# Patient Record
Sex: MALE | Race: WHITE
[De-identification: names, ages, dates, MRNs, and addresses within clinical notes are randomized per-mention and may not be internally consistent; named-entity substitution may affect disease eponyms.]

---

## 2021-02-24 ENCOUNTER — HOSPITAL ENCOUNTER (INPATIENT)
Dept: HOSPITAL 12 - ER | Age: 56
LOS: 2 days | Discharge: NURSING, REHAB OR LONG-TERM CARE | DRG: 917 | End: 2021-02-26
Payer: COMMERCIAL

## 2021-02-24 VITALS — BODY MASS INDEX: 24.68 KG/M2 | HEIGHT: 70 IN | WEIGHT: 172.38 LBS

## 2021-02-24 VITALS — SYSTOLIC BLOOD PRESSURE: 124 MMHG | DIASTOLIC BLOOD PRESSURE: 78 MMHG

## 2021-02-24 DIAGNOSIS — Z95.5: ICD-10-CM

## 2021-02-24 DIAGNOSIS — E86.0: ICD-10-CM

## 2021-02-24 DIAGNOSIS — R53.1: ICD-10-CM

## 2021-02-24 DIAGNOSIS — I25.10: ICD-10-CM

## 2021-02-24 DIAGNOSIS — F41.9: ICD-10-CM

## 2021-02-24 DIAGNOSIS — R62.7: ICD-10-CM

## 2021-02-24 DIAGNOSIS — D72.829: ICD-10-CM
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
## 2021-02-24 DIAGNOSIS — Z86.73: ICD-10-CM

## 2021-02-24 DIAGNOSIS — F13.239: ICD-10-CM

## 2021-02-24 DIAGNOSIS — T42.4X1A: Primary | ICD-10-CM

## 2021-02-24 DIAGNOSIS — Z79.02: ICD-10-CM

## 2021-02-24 DIAGNOSIS — Z79.82: ICD-10-CM

## 2021-02-24 DIAGNOSIS — G92: ICD-10-CM

## 2021-02-24 DIAGNOSIS — F29: ICD-10-CM

## 2021-02-24 DIAGNOSIS — F14.10: ICD-10-CM

## 2021-02-24 DIAGNOSIS — F33.2: ICD-10-CM

## 2021-02-24 DIAGNOSIS — Z20.822: ICD-10-CM

## 2021-02-24 LAB
ALP SERPL-CCNC: 83 U/L (ref 50–136)
ALT SERPL W/O P-5'-P-CCNC: 22 U/L (ref 16–63)
APAP SERPL-MCNC: < 2 UG/ML (ref 10–30)
AST SERPL-CCNC: 14 U/L (ref 15–37)
BASOPHILS # BLD AUTO: 0 K/UL (ref 0–8)
BASOPHILS NFR BLD AUTO: 0.3 % (ref 0–2)
BILIRUB DIRECT SERPL-MCNC: 0.1 MG/DL (ref 0–0.2)
BILIRUB SERPL-MCNC: 0.5 MG/DL (ref 0.2–1)
BUN SERPL-MCNC: 16 MG/DL (ref 7–18)
CHLORIDE SERPL-SCNC: 101 MMOL/L (ref 98–107)
CO2 SERPL-SCNC: 24 MMOL/L (ref 21–32)
CREAT SERPL-MCNC: 0.9 MG/DL (ref 0.6–1.3)
EOSINOPHIL # BLD AUTO: 0 K/UL (ref 0–0.7)
EOSINOPHIL NFR BLD AUTO: 0.3 % (ref 0–7)
ETHANOL SERPL-MCNC: < 3 MG/DL (ref 0–0)
GLUCOSE SERPL-MCNC: 105 MG/DL (ref 74–106)
HCT VFR BLD AUTO: 55.9 % (ref 36.7–47.1)
HGB BLD-MCNC: 19.1 G/DL (ref 12.5–16.3)
LYMPHOCYTES # BLD AUTO: 2.1 K/UL (ref 20–40)
LYMPHOCYTES NFR BLD AUTO: 17.1 % (ref 20.5–51.5)
MCH RBC QN AUTO: 30.4 UUG (ref 23.8–33.4)
MCHC RBC AUTO-ENTMCNC: 34 G/DL (ref 32.5–36.3)
MCV RBC AUTO: 89 FL (ref 73–96.2)
MONOCYTES # BLD AUTO: 1.3 K/UL (ref 2–10)
MONOCYTES NFR BLD AUTO: 10.5 % (ref 0–11)
NEUTROPHILS # BLD AUTO: 8.9 K/UL (ref 1.8–8.9)
NEUTROPHILS NFR BLD AUTO: 71.8 % (ref 38.5–71.5)
PLATELET # BLD AUTO: 348 K/UL (ref 152–348)
POTASSIUM SERPL-SCNC: 4 MMOL/L (ref 3.5–5.1)
RBC # BLD AUTO: 6.28 MIL/UL (ref 4.06–5.63)
TSH SERPL DL<=0.005 MIU/L-ACNC: 2.42 MIU/ML (ref 0.36–3.74)
WBC # BLD AUTO: 12.4 K/UL (ref 3.6–10.2)
WS STN SPEC: 8 G/DL (ref 6.4–8.2)

## 2021-02-24 PROCEDURE — G0378 HOSPITAL OBSERVATION PER HR: HCPCS

## 2021-02-24 PROCEDURE — G0480 DRUG TEST DEF 1-7 CLASSES: HCPCS

## 2021-02-24 RX ADMIN — ASPIRIN SCH MG: 81 TABLET, CHEWABLE ORAL at 21:42

## 2021-02-24 RX ADMIN — Medication SCH MG: at 21:42

## 2021-02-24 RX ADMIN — SODIUM CHLORIDE PRN MLS/HR: 0.9 INJECTION, SOLUTION INTRAVENOUS at 21:43

## 2021-02-24 RX ADMIN — METOPROLOL SUCCINATE SCH MG: 23.75 TABLET, FILM COATED, EXTENDED RELEASE ORAL at 22:55

## 2021-02-24 RX ADMIN — CLOPIDOGREL BISULFATE SCH MG: 75 TABLET ORAL at 21:42

## 2021-02-24 RX ADMIN — ALPRAZOLAM SCH MG: 0.25 TABLET ORAL at 22:56

## 2021-02-24 RX ADMIN — ATORVASTATIN CALCIUM SCH MG: 20 TABLET, FILM COATED ORAL at 21:42

## 2021-02-24 NOTE — NUR
Patient arrived via RA 99 for palpitations, patient able to transfer from 
[facility] to [facility] with assistance, MD at bedside for assessment

## 2021-02-24 NOTE — NUR
Received patient via gurney from ED. Admitted for FTT. No s/s of acute distress noted at 
this time. Patient is AAOX2-3, confused at times. Pt on RA denies SOB. Cardiac monitor in 
place, NSR. Provided patient with reorientation to the unit. Safety measures and precautions 
in place. Bed alarm on, locked in place, and call light within reach.

## 2021-02-25 VITALS — SYSTOLIC BLOOD PRESSURE: 120 MMHG | DIASTOLIC BLOOD PRESSURE: 63 MMHG

## 2021-02-25 VITALS — DIASTOLIC BLOOD PRESSURE: 72 MMHG | SYSTOLIC BLOOD PRESSURE: 112 MMHG

## 2021-02-25 VITALS — DIASTOLIC BLOOD PRESSURE: 61 MMHG | SYSTOLIC BLOOD PRESSURE: 127 MMHG

## 2021-02-25 VITALS — SYSTOLIC BLOOD PRESSURE: 124 MMHG | DIASTOLIC BLOOD PRESSURE: 78 MMHG

## 2021-02-25 VITALS — DIASTOLIC BLOOD PRESSURE: 62 MMHG | SYSTOLIC BLOOD PRESSURE: 114 MMHG

## 2021-02-25 LAB
BASOPHILS # BLD AUTO: 0 K/UL (ref 0–8)
BASOPHILS NFR BLD AUTO: 0.6 % (ref 0–2)
BUN SERPL-MCNC: 15 MG/DL (ref 7–18)
CHLORIDE SERPL-SCNC: 107 MMOL/L (ref 98–107)
CHOLEST SERPL-MCNC: 152 MG/DL (ref ?–200)
CO2 SERPL-SCNC: 20 MMOL/L (ref 21–32)
CREAT SERPL-MCNC: 0.8 MG/DL (ref 0.6–1.3)
EOSINOPHIL # BLD AUTO: 0.1 K/UL (ref 0–0.7)
EOSINOPHIL NFR BLD AUTO: 0.8 % (ref 0–7)
GLUCOSE SERPL-MCNC: 106 MG/DL (ref 74–106)
HCT VFR BLD AUTO: 49.6 % (ref 36.7–47.1)
HDLC SERPL-MCNC: 31 MG/DL (ref 40–60)
HGB BLD-MCNC: 16.8 G/DL (ref 12.5–16.3)
LYMPHOCYTES # BLD AUTO: 1.9 K/UL (ref 20–40)
LYMPHOCYTES NFR BLD AUTO: 22.7 % (ref 20.5–51.5)
MAGNESIUM SERPL-MCNC: 2.3 MG/DL (ref 1.8–2.4)
MCH RBC QN AUTO: 30.7 UUG (ref 23.8–33.4)
MCHC RBC AUTO-ENTMCNC: 34 G/DL (ref 32.5–36.3)
MCV RBC AUTO: 90.6 FL (ref 73–96.2)
MONOCYTES # BLD AUTO: 0.9 K/UL (ref 2–10)
MONOCYTES NFR BLD AUTO: 10.4 % (ref 0–11)
NEUTROPHILS # BLD AUTO: 5.4 K/UL (ref 1.8–8.9)
NEUTROPHILS NFR BLD AUTO: 65.5 % (ref 38.5–71.5)
PHOSPHATE SERPL-MCNC: 2.3 MG/DL (ref 2.5–4.9)
PLATELET # BLD AUTO: 271 K/UL (ref 152–348)
POTASSIUM SERPL-SCNC: 3.5 MMOL/L (ref 3.5–5.1)
RBC # BLD AUTO: 5.47 MIL/UL (ref 4.06–5.63)
TRIGL SERPL-MCNC: 91 MG/DL (ref 30–150)
WBC # BLD AUTO: 8.2 K/UL (ref 3.6–10.2)

## 2021-02-25 RX ADMIN — ALPRAZOLAM SCH MG: 0.25 TABLET ORAL at 08:34

## 2021-02-25 RX ADMIN — CLOPIDOGREL BISULFATE SCH MG: 75 TABLET ORAL at 08:35

## 2021-02-25 RX ADMIN — ASPIRIN SCH MG: 81 TABLET, CHEWABLE ORAL at 08:34

## 2021-02-25 RX ADMIN — Medication SCH MG: at 08:34

## 2021-02-25 RX ADMIN — ALPRAZOLAM SCH MG: 0.25 TABLET ORAL at 12:13

## 2021-02-25 RX ADMIN — ATORVASTATIN CALCIUM SCH MG: 20 TABLET, FILM COATED ORAL at 20:03

## 2021-02-25 RX ADMIN — ALPRAZOLAM SCH MG: 0.25 TABLET ORAL at 17:07

## 2021-02-25 RX ADMIN — SODIUM CHLORIDE PRN MLS/HR: 0.9 INJECTION, SOLUTION INTRAVENOUS at 08:48

## 2021-02-25 RX ADMIN — METOPROLOL SUCCINATE SCH MG: 23.75 TABLET, FILM COATED, EXTENDED RELEASE ORAL at 08:38

## 2021-02-25 RX ADMIN — SODIUM CHLORIDE PRN MLS/HR: 0.9 INJECTION, SOLUTION INTRAVENOUS at 20:08

## 2021-02-25 NOTE — NUR
Note:



This SW met with the patient today to complete an SS assessment.  Reason for assessment is 
hx of drug use.  Patient is a 55 year old male who was BIB by ambulance to the ED, after 
patient called 911.  Patient reports weakness, anxiety, not being able to get out of bed for 
nearly 10 days, and heart palpitations.  Patient reports detoxing from cocaine.  When SW 
entered the patient's room, patient was awake, receptive to speaking with this SW. Patient 
is oriented x 2-3.  Patient knows his name, location, and reason for hospitalization.  
Patient knew the year, but not the month or day.  When asked who the President was, patient 
stated "Weaver".  Patient lives alone at [id number] [place], [place] [address], [place] [address].  
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  When asked 
who his employer was, patient stated "I work for myself".  Patient has his father Kevan 
listed as his person to contact, [phone], and provided this SW verbal consent to 
disclose information to patient's father.  Patient reports being independent with all ADL's 
prior to this hospitalization.  Patient reports hx of cocaine use, reporting that he last 
used about 5-7 days ago.  Patient could not provide how long he had been using cocaine for.  
Patient denied use of alcohol or any other drugs, however reported being a smoker for the 
past 30 years, and deciding to quit about 5 days ago.  Patient has never been , and 
does not have children, however has 5 siblings and about 11-12 nieces and nephews.  SW 
generated a discussion about substance abuse treatment, and patient stated that he has been 
in treatment in the past, but they haven't worked too well for him.  SW offered to provide 
patient with additional resources on substance abuse treatment, and patient was receptive.  
Patient denied hx of psychiatric diagnosis, but reported seeking therapy in the past due to 
some of the challenges her faced when growing up.  Discharge plans discussed, and patient 
would like to return home after this hospitalization.  At this time, psychiatry consult is 
pending.   SW to follow-up with the patient and provide him with community resources.

## 2021-02-25 NOTE — NUR
CONTINUE 1:1 SITTER AT BEDSIDE, PATIENT STILL NOTED TO BE CONFUSED, MUMBLING TO HIMSELF WITH 
EYES CLOSED. AWARE THAT HE IS AT [facility], KNOWS HIS NAME, ABLE TO FOLLOW SIMPLE 
COMMANDS. AWAITING PSYCH CONSULT. DENIES PAIN OR SOB, SR ON MONITOR

## 2021-02-25 NOTE — NUR
Patient now resting intermittently. Pt AAOx2-3, disoriented at times yelling out. Continued 
to provide reorientation.  No s/s of acute distress noted at this time. Pt on RA denies SOB. 
Tele monitor on, NSR and EKG completed this morning. Right FA IV patent and intact. All 
patient needs were met and attended to. Safety measures maintained and bed alarm on, will 
endorse to oncoming nurse.

## 2021-02-25 NOTE — NUR
Pt yelling out, upset, and agitated. Multiple attempts to redirect and reduce stimuli 
failed. Notified on call physician, ROSY Soto. New orders given for Haldol 5mg IM one 
time. Will administer and continue to monitor patient for s/s of adverse reactions and 
monitor for safety.

## 2021-02-26 ENCOUNTER — HOSPITAL ENCOUNTER (INPATIENT)
Dept: HOSPITAL 12 - REHABOV3 | Age: 56
LOS: 1 days | Discharge: LEFT BEFORE DISCHARGE | DRG: 947 | End: 2021-02-27
Attending: PHYSICAL MEDICINE & REHABILITATION | Admitting: PHYSICAL MEDICINE & REHABILITATION
Payer: COMMERCIAL

## 2021-02-26 VITALS — SYSTOLIC BLOOD PRESSURE: 105 MMHG | DIASTOLIC BLOOD PRESSURE: 52 MMHG

## 2021-02-26 VITALS — DIASTOLIC BLOOD PRESSURE: 64 MMHG | SYSTOLIC BLOOD PRESSURE: 103 MMHG

## 2021-02-26 VITALS — DIASTOLIC BLOOD PRESSURE: 68 MMHG | SYSTOLIC BLOOD PRESSURE: 101 MMHG

## 2021-02-26 VITALS — SYSTOLIC BLOOD PRESSURE: 107 MMHG | DIASTOLIC BLOOD PRESSURE: 66 MMHG

## 2021-02-26 VITALS — WEIGHT: 180 LBS | BODY MASS INDEX: 25.77 KG/M2 | HEIGHT: 70 IN

## 2021-02-26 VITALS — SYSTOLIC BLOOD PRESSURE: 116 MMHG | DIASTOLIC BLOOD PRESSURE: 69 MMHG

## 2021-02-26 DIAGNOSIS — R53.1: Primary | ICD-10-CM

## 2021-02-26 DIAGNOSIS — E86.0: ICD-10-CM

## 2021-02-26 DIAGNOSIS — F13.259: ICD-10-CM

## 2021-02-26 DIAGNOSIS — Z86.73: ICD-10-CM

## 2021-02-26 DIAGNOSIS — F41.9: ICD-10-CM

## 2021-02-26 DIAGNOSIS — Z95.5: ICD-10-CM

## 2021-02-26 DIAGNOSIS — G92: ICD-10-CM

## 2021-02-26 DIAGNOSIS — F32.9: ICD-10-CM

## 2021-02-26 DIAGNOSIS — F14.10: ICD-10-CM

## 2021-02-26 DIAGNOSIS — R62.7: ICD-10-CM

## 2021-02-26 DIAGNOSIS — I25.10: ICD-10-CM

## 2021-02-26 LAB
BUN SERPL-MCNC: 10 MG/DL (ref 7–18)
CHLORIDE SERPL-SCNC: 107 MMOL/L (ref 98–107)
CO2 SERPL-SCNC: 22 MMOL/L (ref 21–32)
CREAT SERPL-MCNC: 0.8 MG/DL (ref 0.6–1.3)
GLUCOSE SERPL-MCNC: 97 MG/DL (ref 74–106)
PHOSPHATE SERPL-MCNC: 2.4 MG/DL (ref 2.5–4.9)
POTASSIUM SERPL-SCNC: 3.7 MMOL/L (ref 3.5–5.1)

## 2021-02-26 RX ADMIN — METOPROLOL SUCCINATE SCH MG: 23.75 TABLET, FILM COATED, EXTENDED RELEASE ORAL at 08:44

## 2021-02-26 RX ADMIN — SODIUM CHLORIDE PRN MLS/HR: 0.9 INJECTION, SOLUTION INTRAVENOUS at 17:31

## 2021-02-26 RX ADMIN — ALPRAZOLAM SCH MG: 0.25 TABLET ORAL at 08:43

## 2021-02-26 RX ADMIN — LORAZEPAM PRN MG: 1 TABLET ORAL at 21:49

## 2021-02-26 RX ADMIN — ASPIRIN SCH MG: 81 TABLET, CHEWABLE ORAL at 08:43

## 2021-02-26 RX ADMIN — ALPRAZOLAM SCH MG: 0.25 TABLET ORAL at 12:54

## 2021-02-26 RX ADMIN — CLOPIDOGREL BISULFATE SCH MG: 75 TABLET ORAL at 08:43

## 2021-02-26 RX ADMIN — SODIUM CHLORIDE PRN MLS/HR: 0.9 INJECTION, SOLUTION INTRAVENOUS at 06:05

## 2021-02-26 NOTE — NUR
Pt has been concerned about receiving Ativan and Xanax. Notified Dr. Jami Pickard, with 
new orders.   All due medication administered. Pt remains calm at the moment and denies pain 
or SOB. Does not appear to be having any anxiety at this time. Bed is locked and in lowest 
position, call light is within reach. No other issues or concerns at this time.

## 2021-02-26 NOTE — NUR
SW met with the patient today, to provide him with community resources.  Patient is awake, 
alert, oriented, receptive to speaking with this SW.  SW provided patient with the following 
resources for substance abuse treatment programs:  [facility], [address] [place] [address]. [place] [address], [phone];  [facility] [address] [facility] [place] 
[address], [phone];   [facility] [address] [place] [address]. [place] [address], 
[phone];

[facility] [address] [facility] [place] [address], [phone];  [facility] [address] [facility] [place] [address], [phone].  Patient was receptive, and stated that he wants 
to physically get stronger first before starting treatment, but expressed appreciation for 
the resources provided.  SW explored additional psychosocial needs for the patient, and 
patient stated not have any other concerns or needs at this time. 

No further SS interventions needed at this time, however SW will remain available, as 
needed.

## 2021-02-26 NOTE — NUR
PLEASANT AND COOPERATIVE. VSS. DISCUSSED DISCHARGE PLAN TO THE ACUTE REHAB UNIT. VERBALIZED 
UNDERSTANDING OF PLAN. IV FLUSHED AND PATENT. MD AWARE OF TRANSFER. MEDICATION GIVEN AS 
PRESCRIBED. SAFETY PRECAUTIONS IN PLACE. WILL ENDORSE TO ADMITTING ARU NURSE. WILL CONTINUE 
TO MONITOR.

## 2021-02-26 NOTE — NUR
Relieved report from day shift nurse. Pt admitted ARU DX: Generalized weakness, debility. No 
s/s of acute distress noted at this time. Patient is AAOX4. Pt on RA denies SOB. Provided 
patient with reorientation to the unit. MD on call made aware pending med recon. Dr. Short 
notified of new admission orders. Skin assessment completed, skin intact. Belongings list 
completed and placed in chart.  Safety measures and precautions in place. Bed alarm on, 
locked in place, and call light within reach.  Routine admission care done. Plan of care 
initiated.

## 2021-02-26 NOTE — NUR
Pt is currently resting in bed. Received AOX2 with sitter at bedside. Pt has been calm 
throughout shift so far and denies pain or SOB. Does not appear to be having any anxiety at 
this time. Pt is SR on monitor. On RA sating at 94%. Bed is locked and in lowest position, 
call light is within reach. No other issues or concerns at this time.

## 2021-02-27 VITALS — DIASTOLIC BLOOD PRESSURE: 64 MMHG | SYSTOLIC BLOOD PRESSURE: 111 MMHG

## 2021-02-27 VITALS — DIASTOLIC BLOOD PRESSURE: 65 MMHG | SYSTOLIC BLOOD PRESSURE: 108 MMHG

## 2021-02-27 VITALS — DIASTOLIC BLOOD PRESSURE: 65 MMHG | SYSTOLIC BLOOD PRESSURE: 111 MMHG

## 2021-02-27 RX ADMIN — LORAZEPAM PRN MG: 1 TABLET ORAL at 10:56

## 2021-02-27 NOTE — NUR
CALLED PATIENT AND INQUIRED FROM HIM WHEN HE WAS COMING BACK AND HE STATED NOT TONIGHT 
BECAUSE HE IS NOT FINISHED WITH WHAT HE IS DOING AT HIS HOUSE WILL CALL US TOMORROW TO LET 
US KNOW HIS DECISION SO CALLED EUGENIA THE REHAB DISCHARGE PLANNER AND EDISON GALLEGOS BOTH 
NOTIFIED THAT PATIENT HAS DECIDED NOT TO COME BACK TODAY

## 2021-02-27 NOTE — NUR
PATIENT ALERT AND ORIENTED STATED FEELING ANXIOUS MEDICATED WITH ATIVAN PO AS ORDERED 
REASSURED AND MADE COMFORTABLE WILL CONTINUE TO OBSERVE.

## 2021-02-27 NOTE — NUR
PATIENT SIGNED THE PASS RELEASE FORM AND ESCORTED TO THE FRONT LOBBY AND WAS PICKED UP BY 
HIS FRIEND FAUSTINO  STATED WILL CALL ME AS SOON AS HE HAS SITUATED HIS ISSUES.

## 2021-02-27 NOTE — NUR
CALLED AND SPOKE WITH EUGENIA THE REHAB  DISCHARGE PLANNER AND HE STATED THAT IT 
WAS OKAY FOR THE PATIENT TO GO OUT ON PASS AND THAT PATIENT HAS INFORMED HIM THAT HE NEEDED 
TO SEE HIS  FOR SOME HOUSE WORK AND THAT IT WAS OKAY FOR HIM TO GO FOR 4 HOURS HE 
JUST NEEDED TO SIGN THE OUT ON PASS FORM PATIENT NOTIFIED

## 2021-02-27 NOTE — NUR
Administered Xanax, per pt request, unable to sleep. Will continue to monitor throughout the 
night.

## 2021-02-27 NOTE — NUR
PATIENT IS EXPRESSING THAT HE WILL NOT BE ABLE TO STAY HERE IN THE HOSPITAL FOR 2 WEEKS 
BECAUSE HE DOES NOT HAVE CLOTHES TO WEAR ASKED HIM IF HE HAS ANYONE THAT CAN DROP OFF SOME 
CLOTHES FOR HIM STATED HE HAS NO ONE SO I FOUND HIM SOME TOPS BUT WAS UNABLE TO FIND PANTS 
ADVISED HIM TO MANAGE THE HOSPITAL PJ FOR NOW ENCOURAGED HIM TO PARTICIPATE IN THE PT/OT 
SERVICES AS ORDERED FOR HIS BENEFIT FOR NOW ADVISED HIM THAT HE DID NOT HAVE TO STAY HERE 
FOR 2 WEEKS BUT TO GIVE HIS RECOVERY AND THERAPY A CHANCE AND IF HE IS GETTING BETTER FASTER 
THEN HE MIGHT BE DISCHARGED SOONER AND HE EXPRESSED UNDERSTANDING.

## 2021-02-27 NOTE — NUR
CALL RECEIVED FROM EUGENIA DISCHARGE PLANNER STATED PATIENT SHOULD BE DISCHARGED FROM THE 
SYSTEM AS AMA

## 2021-02-27 NOTE — NUR
CALL RECEIVED FROM PATIENT MR CEBALLOS STATED THAT HE IS STILL AT HOME WORKING ON HIS 
WINDOWS WITH HIS  STILL HAS A LOT OF PAPERWORK YET TOLD HIM THAT HE HAS TIME TO 
FINISH WHATEVER HE WANTED TO DO TO CALL ME AGAIN LATER HE HAS UP TO 630PM AND SAID OKAY.

## 2021-07-24 ENCOUNTER — HOSPITAL ENCOUNTER (EMERGENCY)
Dept: HOSPITAL 12 - ER | Age: 56
Discharge: HOME | End: 2021-07-24
Payer: COMMERCIAL

## 2021-07-24 VITALS — BODY MASS INDEX: 28 KG/M2 | WEIGHT: 200 LBS | HEIGHT: 71 IN

## 2021-07-24 VITALS — DIASTOLIC BLOOD PRESSURE: 85 MMHG | SYSTOLIC BLOOD PRESSURE: 135 MMHG

## 2021-07-24 DIAGNOSIS — K76.0: ICD-10-CM

## 2021-07-24 DIAGNOSIS — F41.0: ICD-10-CM

## 2021-07-24 DIAGNOSIS — Z79.02: ICD-10-CM

## 2021-07-24 DIAGNOSIS — Z79.899: ICD-10-CM

## 2021-07-24 DIAGNOSIS — Z79.82: ICD-10-CM

## 2021-07-24 DIAGNOSIS — I25.10: ICD-10-CM

## 2021-07-24 DIAGNOSIS — I25.2: ICD-10-CM

## 2021-07-24 DIAGNOSIS — Z86.73: ICD-10-CM

## 2021-07-24 DIAGNOSIS — F14.10: ICD-10-CM

## 2021-07-24 DIAGNOSIS — I10: ICD-10-CM

## 2021-07-24 DIAGNOSIS — N20.0: Primary | ICD-10-CM

## 2021-07-24 DIAGNOSIS — Z95.5: ICD-10-CM

## 2021-07-24 LAB
ALP SERPL-CCNC: 70 U/L (ref 50–136)
ALT SERPL W/O P-5'-P-CCNC: 21 U/L (ref 16–63)
AMPHETAMINES UR QL SCN>1000 NG/ML: NEGATIVE
APPEARANCE UR: CLEAR
AST SERPL-CCNC: 24 U/L (ref 15–37)
BILIRUB DIRECT SERPL-MCNC: 0.1 MG/DL (ref 0–0.2)
BILIRUB SERPL-MCNC: 0.7 MG/DL (ref 0.2–1)
BILIRUB UR QL STRIP: NEGATIVE
BUN SERPL-MCNC: 11 MG/DL (ref 7–18)
CHLORIDE SERPL-SCNC: 100 MMOL/L (ref 98–107)
CO2 SERPL-SCNC: 21 MMOL/L (ref 21–32)
COCAINE UR QL SCN: POSITIVE
COLOR UR: YELLOW
CREAT SERPL-MCNC: 1 MG/DL (ref 0.6–1.3)
ETHANOL SERPL-MCNC: < 3 MG/DL (ref 0–0)
GLUCOSE SERPL-MCNC: 104 MG/DL (ref 74–106)
GLUCOSE UR STRIP-MCNC: NEGATIVE MG/DL
HCT VFR BLD AUTO: 50.1 % (ref 36.7–47.1)
HGB UR QL STRIP: NEGATIVE
KETONES UR STRIP-MCNC: (no result) MG/DL
LEUKOCYTE ESTERASE UR QL STRIP: NEGATIVE
LIPASE SERPL-CCNC: 41 U/L (ref 73–393)
MCH RBC QN AUTO: 31.4 UUG (ref 23.8–33.4)
MCV RBC AUTO: 90.2 FL (ref 73–96.2)
NITRITE UR QL STRIP: NEGATIVE
OPIATES UR QL SCN: NEGATIVE
PCP UR QL SCN>25 NG/ML: NEGATIVE
PH UR STRIP: 6 [PH] (ref 5–8)
PLATELET # BLD AUTO: 296 K/UL (ref 152–348)
POTASSIUM SERPL-SCNC: 3.8 MMOL/L (ref 3.5–5.1)
SP GR UR STRIP: <=1.005 (ref 1–1.03)
THC UR QL SCN>50 NG/ML: NEGATIVE
UROBILINOGEN UR STRIP-MCNC: 0.2 E.U./DL
WS STN SPEC: 8.3 G/DL (ref 6.4–8.2)

## 2021-07-24 PROCEDURE — 96374 THER/PROPH/DIAG INJ IV PUSH: CPT

## 2021-07-24 PROCEDURE — 80076 HEPATIC FUNCTION PANEL: CPT

## 2021-07-24 PROCEDURE — 96375 TX/PRO/DX INJ NEW DRUG ADDON: CPT

## 2021-07-24 PROCEDURE — 83690 ASSAY OF LIPASE: CPT

## 2021-07-24 PROCEDURE — 96361 HYDRATE IV INFUSION ADD-ON: CPT

## 2021-07-24 PROCEDURE — 99284 EMERGENCY DEPT VISIT MOD MDM: CPT

## 2021-07-24 PROCEDURE — 80320 DRUG SCREEN QUANTALCOHOLS: CPT

## 2021-07-24 PROCEDURE — 80307 DRUG TEST PRSMV CHEM ANLYZR: CPT

## 2021-07-24 PROCEDURE — G0480 DRUG TEST DEF 1-7 CLASSES: HCPCS

## 2021-07-24 PROCEDURE — 81003 URINALYSIS AUTO W/O SCOPE: CPT

## 2021-07-24 PROCEDURE — 36415 COLL VENOUS BLD VENIPUNCTURE: CPT

## 2021-07-24 PROCEDURE — 80048 BASIC METABOLIC PNL TOTAL CA: CPT

## 2021-07-24 PROCEDURE — 85025 COMPLETE CBC W/AUTO DIFF WBC: CPT

## 2021-07-24 PROCEDURE — 83605 ASSAY OF LACTIC ACID: CPT

## 2021-07-24 PROCEDURE — 74176 CT ABD & PELVIS W/O CONTRAST: CPT

## 2021-07-24 NOTE — NUR
Pt ELIO FLEMING, reports pt had cocaine approx 60 hours ago, feels like he is 
"withdrawing."  Usually uses benzos but has not taken any for 4 days, has not 
eaten in 5 days.  Pt also c/o distended stomach and dysuria. Pt denies CP, SOB, 
dizziness, no distress noted, but pt appears very anxious.

## 2021-07-25 ENCOUNTER — HOSPITAL ENCOUNTER (INPATIENT)
Dept: HOSPITAL 12 - ER | Age: 56
LOS: 1 days | Discharge: HOME | DRG: 880 | End: 2021-07-26
Payer: COMMERCIAL

## 2021-07-25 VITALS — WEIGHT: 200 LBS | HEIGHT: 71 IN | BODY MASS INDEX: 28 KG/M2

## 2021-07-25 DIAGNOSIS — N20.0: ICD-10-CM

## 2021-07-25 DIAGNOSIS — F14.90: ICD-10-CM

## 2021-07-25 DIAGNOSIS — F41.9: Primary | ICD-10-CM

## 2021-07-25 DIAGNOSIS — D72.829: ICD-10-CM

## 2021-07-25 DIAGNOSIS — Z95.5: ICD-10-CM

## 2021-07-25 DIAGNOSIS — Z86.73: ICD-10-CM

## 2021-07-25 DIAGNOSIS — Z20.822: ICD-10-CM

## 2021-07-25 DIAGNOSIS — R07.89: ICD-10-CM

## 2021-07-25 DIAGNOSIS — I25.10: ICD-10-CM

## 2021-07-25 DIAGNOSIS — Z79.82: ICD-10-CM

## 2021-07-25 DIAGNOSIS — I25.2: ICD-10-CM

## 2021-07-25 LAB
ALP SERPL-CCNC: 64 U/L (ref 50–136)
ALT SERPL W/O P-5'-P-CCNC: 25 U/L (ref 16–63)
AMPHETAMINES UR QL SCN>1000 NG/ML: NEGATIVE
AST SERPL-CCNC: 13 U/L (ref 15–37)
BILIRUB DIRECT SERPL-MCNC: 0.1 MG/DL (ref 0–0.2)
BILIRUB SERPL-MCNC: 0.6 MG/DL (ref 0.2–1)
BUN SERPL-MCNC: 11 MG/DL (ref 7–18)
CHLORIDE SERPL-SCNC: 101 MMOL/L (ref 98–107)
CO2 SERPL-SCNC: 22 MMOL/L (ref 21–32)
COCAINE UR QL SCN: POSITIVE
CREAT SERPL-MCNC: 1.2 MG/DL (ref 0.6–1.3)
GLUCOSE SERPL-MCNC: 140 MG/DL (ref 74–106)
HCT VFR BLD AUTO: 52.1 % (ref 36.7–47.1)
MCH RBC QN AUTO: 31.7 UUG (ref 23.8–33.4)
MCV RBC AUTO: 90.8 FL (ref 73–96.2)
OPIATES UR QL SCN: NEGATIVE
PCP UR QL SCN>25 NG/ML: NEGATIVE
PLATELET # BLD AUTO: 279 K/UL (ref 152–348)
POTASSIUM SERPL-SCNC: 3.4 MMOL/L (ref 3.5–5.1)
THC UR QL SCN>50 NG/ML: NEGATIVE
WS STN SPEC: 7.5 G/DL (ref 6.4–8.2)

## 2021-07-25 PROCEDURE — A4663 DIALYSIS BLOOD PRESSURE CUFF: HCPCS

## 2021-07-25 PROCEDURE — G0378 HOSPITAL OBSERVATION PER HR: HCPCS

## 2021-07-26 VITALS — DIASTOLIC BLOOD PRESSURE: 82 MMHG | SYSTOLIC BLOOD PRESSURE: 105 MMHG

## 2021-07-26 VITALS — DIASTOLIC BLOOD PRESSURE: 78 MMHG | SYSTOLIC BLOOD PRESSURE: 124 MMHG

## 2021-07-26 VITALS — SYSTOLIC BLOOD PRESSURE: 112 MMHG | DIASTOLIC BLOOD PRESSURE: 67 MMHG

## 2021-07-26 VITALS — DIASTOLIC BLOOD PRESSURE: 66 MMHG | SYSTOLIC BLOOD PRESSURE: 111 MMHG

## 2021-07-26 VITALS — SYSTOLIC BLOOD PRESSURE: 114 MMHG | DIASTOLIC BLOOD PRESSURE: 70 MMHG

## 2021-07-26 LAB
ALP SERPL-CCNC: 61 U/L (ref 50–136)
ALT SERPL W/O P-5'-P-CCNC: 25 U/L (ref 16–63)
AST SERPL-CCNC: 13 U/L (ref 15–37)
BILIRUB SERPL-MCNC: 0.4 MG/DL (ref 0.2–1)
BUN SERPL-MCNC: 15 MG/DL (ref 7–18)
CHLORIDE SERPL-SCNC: 108 MMOL/L (ref 98–107)
CHOLEST SERPL-MCNC: 152 MG/DL (ref ?–200)
CO2 SERPL-SCNC: 23 MMOL/L (ref 21–32)
CREAT SERPL-MCNC: 1 MG/DL (ref 0.6–1.3)
GLUCOSE SERPL-MCNC: 101 MG/DL (ref 74–106)
HCT VFR BLD AUTO: 49 % (ref 36.7–47.1)
HDLC SERPL-MCNC: 36 MG/DL (ref 40–60)
MAGNESIUM SERPL-MCNC: 2.7 MG/DL (ref 1.8–2.4)
MCH RBC QN AUTO: 31.6 UUG (ref 23.8–33.4)
MCV RBC AUTO: 92.7 FL (ref 73–96.2)
PHOSPHATE SERPL-MCNC: 2.6 MG/DL (ref 2.5–4.9)
PLATELET # BLD AUTO: 258 K/UL (ref 152–348)
POTASSIUM SERPL-SCNC: 4.1 MMOL/L (ref 3.5–5.1)
TRIGL SERPL-MCNC: 114 MG/DL (ref 30–150)
TSH SERPL DL<=0.005 MIU/L-ACNC: 2.76 MIU/ML (ref 0.36–3.74)
WS STN SPEC: 7 G/DL (ref 6.4–8.2)

## 2021-07-26 NOTE — NUR
RECEIVED REPORT FROM ER NURSE JEFF PT IS ALERT AND ORIENTED X4 PT WAS GIVEN 1 LITER OF NS 
IN ER ALONG WITH WITH 20 MEQ OF POTASSIUM FOR POTASSIUM LEVEL OF 3.4 AND AFTERWARDS ALONG 
GIVEN ATIVAN 2MG FOR ANXIETY. PT TROPONIN LEVELS WERE FIRST ONE WAS NEGATIVE THE  SECOND ONE 
TROPONIN ONE WAS .024.AM LABS ARE SCHEDULED FOR THIS AM ,  PT WAS GIVEN MORPHINE FOR PAIN 
1MG PAIN OF 8/10. PT REASSESSED IN 30 MINUTES PATIENT IS ASLEEP. WILL CONTINUE TO MONITOR 
FOR SAFETY AND OTHER NEEDS PATIENT MAY HAVE.

## 2021-07-26 NOTE — NUR
RECEIVED REPORT FROM NIGHT SHIFT NURSE. PT IN BED, AWAKE, ALERT AND ORIENTED X4. NURSE 
IDENTIFIED PATIENT BY NAME, BIRTHDAY AND MEDICAL NUMBER. NURSE INTRODUCED SELF AND ADDRESSED 
ALL PATIENT CONCERNS. NO COMPLAIN OF PAIN NOTED. NO SIGNS OF DISTRESS OR SOB NOTED. PT ABLE 
TO AMBULATE BY SELF. PT ON ROOM AIR AND SATURATING AT 98%. PT ANXIOUS TO LEAVE TODAY. WILL 
CONTINUE TO FOLLOW UP WITH PLAN OF CARE.

## 2021-07-26 NOTE — NUR
PT WAS SEEN BY DR. HANSON. PER PHYSICIAN, PT IS CLEARED WITH CARDIAC ASPECT. NURSE 
NOTIFIED DR. LOPEZ. SHE WILL SEE PATIENT WHEN SHE ARRIVES.

## 2021-07-26 NOTE — NUR
Dr. Chatterjee on panel call with Danielle Soto NP. Patient accepted for admission 
to [facility], diagnosis: chestpain rule out.

## 2021-07-26 NOTE — NUR
DISCHARGE ORDERS IN. PREPARED DISCHARGE. VITAL SIGN DONE. NO DISTRESS OR SOB NOTED. PT 
VERBALIZED HAPPINESS AND EXCITEMENT. NO COMPLAIN NOTED AT THIS TIME. PT SIGNED PAPERWORK AND 
GATHERED PERSONAL BELONGINGS. PT LEFT WITH GIRLFRIEND IN PRIVATE CAR.

## 2024-05-09 NOTE — NUR
SEEN BY NP EDISON FOR FOLLOW-UP ORDERED PSYCH EVAL FOR CONSULT, CONTINUE 1:1 SITTER. General: AAOx3, NAD  Eyes: The sclera and conjunctiva normal, pupils equal in size.  ENT: The ears and nose were normal in appearance; oropharynx clear, moist mucus membranes.  Neck: The appearance of the neck was normal, with no gross masses or nodules.  Respiratory: Unlabored, no retractions.  CV: RRR  Neurological: No focal deficit, moves all extremities.  Exercise Tolerance:  METS limited to 3-4 flights by knee pain